# Patient Record
Sex: FEMALE | Race: WHITE | NOT HISPANIC OR LATINO
[De-identification: names, ages, dates, MRNs, and addresses within clinical notes are randomized per-mention and may not be internally consistent; named-entity substitution may affect disease eponyms.]

---

## 2019-08-12 PROBLEM — Z00.00 ENCOUNTER FOR PREVENTIVE HEALTH EXAMINATION: Status: ACTIVE | Noted: 2019-08-12

## 2019-09-03 ENCOUNTER — LABORATORY RESULT (OUTPATIENT)
Age: 49
End: 2019-09-03

## 2019-09-03 ENCOUNTER — OUTPATIENT (OUTPATIENT)
Dept: OUTPATIENT SERVICES | Facility: HOSPITAL | Age: 49
LOS: 1 days | Discharge: HOME | End: 2019-09-03

## 2019-09-03 ENCOUNTER — APPOINTMENT (OUTPATIENT)
Dept: OBGYN | Facility: CLINIC | Age: 49
End: 2019-09-03
Payer: COMMERCIAL

## 2019-09-03 VITALS — HEIGHT: 63 IN | BODY MASS INDEX: 35.44 KG/M2 | WEIGHT: 200 LBS

## 2019-09-03 PROCEDURE — 99396 PREV VISIT EST AGE 40-64: CPT

## 2019-09-06 DIAGNOSIS — Z01.419 ENCOUNTER FOR GYNECOLOGICAL EXAMINATION (GENERAL) (ROUTINE) WITHOUT ABNORMAL FINDINGS: ICD-10-CM

## 2019-09-17 ENCOUNTER — APPOINTMENT (OUTPATIENT)
Dept: OBGYN | Facility: CLINIC | Age: 49
End: 2019-09-17
Payer: COMMERCIAL

## 2019-09-17 PROCEDURE — 76830 TRANSVAGINAL US NON-OB: CPT

## 2019-09-17 PROCEDURE — 76856 US EXAM PELVIC COMPLETE: CPT | Mod: 59

## 2020-12-01 ENCOUNTER — APPOINTMENT (OUTPATIENT)
Dept: OBGYN | Facility: CLINIC | Age: 50
End: 2020-12-01
Payer: COMMERCIAL

## 2020-12-01 ENCOUNTER — LABORATORY RESULT (OUTPATIENT)
Age: 50
End: 2020-12-01

## 2020-12-01 VITALS — BODY MASS INDEX: 35.44 KG/M2 | TEMPERATURE: 97 F | WEIGHT: 200 LBS | HEIGHT: 63 IN

## 2020-12-01 DIAGNOSIS — D21.9 BENIGN NEOPLASM OF CONNECTIVE AND OTHER SOFT TISSUE, UNSPECIFIED: ICD-10-CM

## 2020-12-01 PROCEDURE — 99072 ADDL SUPL MATRL&STAF TM PHE: CPT

## 2020-12-01 PROCEDURE — 99396 PREV VISIT EST AGE 40-64: CPT

## 2020-12-23 ENCOUNTER — APPOINTMENT (OUTPATIENT)
Dept: OBGYN | Facility: CLINIC | Age: 50
End: 2020-12-23
Payer: COMMERCIAL

## 2020-12-23 PROCEDURE — 99072 ADDL SUPL MATRL&STAF TM PHE: CPT

## 2020-12-23 PROCEDURE — 76705 ECHO EXAM OF ABDOMEN: CPT

## 2020-12-23 PROCEDURE — 76830 TRANSVAGINAL US NON-OB: CPT

## 2021-01-12 ENCOUNTER — LABORATORY RESULT (OUTPATIENT)
Age: 51
End: 2021-01-12

## 2021-02-08 ENCOUNTER — NON-APPOINTMENT (OUTPATIENT)
Age: 51
End: 2021-02-08

## 2021-12-07 ENCOUNTER — APPOINTMENT (OUTPATIENT)
Dept: OBGYN | Facility: CLINIC | Age: 51
End: 2021-12-07

## 2022-04-25 ENCOUNTER — LABORATORY RESULT (OUTPATIENT)
Age: 52
End: 2022-04-25

## 2022-04-26 ENCOUNTER — APPOINTMENT (OUTPATIENT)
Dept: OBGYN | Facility: CLINIC | Age: 52
End: 2022-04-26
Payer: COMMERCIAL

## 2022-04-26 VITALS — WEIGHT: 210 LBS | BODY MASS INDEX: 37.21 KG/M2 | HEIGHT: 63 IN | TEMPERATURE: 98 F

## 2022-04-26 PROCEDURE — 99396 PREV VISIT EST AGE 40-64: CPT

## 2022-12-22 ENCOUNTER — NON-APPOINTMENT (OUTPATIENT)
Age: 52
End: 2022-12-22

## 2023-01-03 ENCOUNTER — APPOINTMENT (OUTPATIENT)
Dept: OBGYN | Facility: CLINIC | Age: 53
End: 2023-01-03
Payer: COMMERCIAL

## 2023-01-03 VITALS — TEMPERATURE: 98 F | HEIGHT: 62 IN | BODY MASS INDEX: 38.64 KG/M2 | WEIGHT: 210 LBS

## 2023-01-03 DIAGNOSIS — N81.10 CYSTOCELE, UNSPECIFIED: ICD-10-CM

## 2023-01-03 DIAGNOSIS — N39.3 STRESS INCONTINENCE (FEMALE) (MALE): ICD-10-CM

## 2023-01-03 DIAGNOSIS — N60.09 SOLITARY CYST OF UNSPECIFIED BREAST: ICD-10-CM

## 2023-01-03 PROCEDURE — 99213 OFFICE O/P EST LOW 20 MIN: CPT

## 2023-01-04 PROBLEM — N39.3 URINARY INCONTINENCE, STRESS, MALE: Status: ACTIVE | Noted: 2022-04-27

## 2023-01-04 PROBLEM — N81.10 FEMALE CYSTOCELE: Status: ACTIVE | Noted: 2023-01-04

## 2023-01-04 PROBLEM — N60.09 BREAST CYST: Status: ACTIVE | Noted: 2023-01-04

## 2023-06-27 ENCOUNTER — NON-APPOINTMENT (OUTPATIENT)
Age: 53
End: 2023-06-27

## 2023-07-07 DIAGNOSIS — R92.8 OTHER ABNORMAL AND INCONCLUSIVE FINDINGS ON DIAGNOSTIC IMAGING OF BREAST: ICD-10-CM

## 2024-03-04 ENCOUNTER — LABORATORY RESULT (OUTPATIENT)
Age: 54
End: 2024-03-04

## 2024-03-05 ENCOUNTER — APPOINTMENT (OUTPATIENT)
Dept: OBGYN | Facility: CLINIC | Age: 54
End: 2024-03-05
Payer: COMMERCIAL

## 2024-03-05 VITALS — HEIGHT: 62 IN | BODY MASS INDEX: 39.56 KG/M2 | WEIGHT: 215 LBS | TEMPERATURE: 93.5 F

## 2024-03-05 DIAGNOSIS — Z78.0 ASYMPTOMATIC MENOPAUSAL STATE: ICD-10-CM

## 2024-03-05 DIAGNOSIS — Z01.419 ENCOUNTER FOR GYNECOLOGICAL EXAMINATION (GENERAL) (ROUTINE) W/OUT ABNORMAL FINDINGS: ICD-10-CM

## 2024-03-05 PROCEDURE — 99396 PREV VISIT EST AGE 40-64: CPT

## 2024-03-07 PROBLEM — Z78.0 MENOPAUSE: Status: ACTIVE | Noted: 2024-03-07

## 2024-03-07 PROBLEM — Z01.419 WELL WOMAN EXAM: Status: ACTIVE | Noted: 2019-09-03

## 2024-03-07 NOTE — DISCUSSION/SUMMARY
[FreeTextEntry1] : 53 YEAR OLD FEMALE LMP 3/3/2021-(HYSTERECTOMY -REMOVAL OF UTERUS AND TUBES) PRESNTS FOR ANNUAL WELL WOMAN GYNECOLOGOIC EXAMINATION AND PAP .LAST SEEN FOR GYN VISIT 1/3/2023.  LAST WELL WOMAN VISIT AND  PAP WAS DONE 4/26/22; NEGATIVE PAP AND HPV HR. NO NEW MEDICAL OR SURGICAL HISTORY SINCE LAST VISIT. MEDICATIONS; VITAMINS D 1000 IU/DAY MEDICATION ALLERGIES; NONE ROS;BMS-NROMAL NO FAM HISTORY OF COLON CANCER; NO BASELINE COLONOSCOPY YET               PT IS GOING SOON.           + STRESS URINARY INCONTINENCE-UNCHANGED           NOT SEXUALLY ACTIVE. BREASTS; STILL NOT DOING BREAST SELF EXAMINATION                     RECNETLY HAD MAMMOGRAHY AND BREAST US RT- BIRADS III- F/U FOR 6 MONTHS                     SISTER WITH HISTORY OF BREAST CANCER + WALKS; + MULTIVITAMINS; + DAIRY; - TOBACCO OR VAPING  PE; /90; TEMP 98./3;WEIGHT 215 LBS HEIGHT 5'2"        BREASTS; NO MASSES OR DISCHARGES        ABDOMEN;SOFT ; NO MASSES; NO TENDERNESS TO PALPATION        PELVIC; NORMAL EXTERNAL GENITALIA                       NORMAL URETHRAL MEATUS                       NORMAL VAGINA WITHOUT LESION                       NORMAL VAGINAL VAULT                       NO PALPABLE ADNEXAL MASSES ON BIMANUAL EXAMINATION                       NO PERINEAL OR ARLENE RECTAL LESIONS  IMP; WELL WOMAN          MENOPAUSE          STRESS URINARY INCONTINENCE          POST GARRY BS  PLAN ;THIN PREP PAP WITH HR HPV TESTING DONE-PT OT CALL IN 2 WKS FOR RESULTS             BREAST SELF EXAMINATION MONTHLY CONTINUED TO BE STRONGLY ADVISED             FOLOLOW UP RT BREAST US IN 6 MONTHS-SCIRPT GIVEN; MAMMMGORPAHY 1 YR             ADVISED PT TO SEE PCP RE - ELEVATED BPS.

## 2025-02-17 ENCOUNTER — NON-APPOINTMENT (OUTPATIENT)
Age: 55
End: 2025-02-17

## 2025-02-18 ENCOUNTER — NON-APPOINTMENT (OUTPATIENT)
Age: 55
End: 2025-02-18

## 2025-04-08 ENCOUNTER — LABORATORY RESULT (OUTPATIENT)
Age: 55
End: 2025-04-08

## 2025-04-08 ENCOUNTER — APPOINTMENT (OUTPATIENT)
Dept: OBGYN | Facility: CLINIC | Age: 55
End: 2025-04-08
Payer: COMMERCIAL

## 2025-04-08 VITALS — WEIGHT: 228 LBS | TEMPERATURE: 97.1 F | BODY MASS INDEX: 41.96 KG/M2 | HEIGHT: 62 IN

## 2025-04-08 DIAGNOSIS — I10 ESSENTIAL (PRIMARY) HYPERTENSION: ICD-10-CM

## 2025-04-08 DIAGNOSIS — N39.3 STRESS INCONTINENCE (FEMALE) (MALE): ICD-10-CM

## 2025-04-08 DIAGNOSIS — Z01.419 ENCOUNTER FOR GYNECOLOGICAL EXAMINATION (GENERAL) (ROUTINE) W/OUT ABNORMAL FINDINGS: ICD-10-CM

## 2025-04-08 DIAGNOSIS — N81.10 CYSTOCELE, UNSPECIFIED: ICD-10-CM

## 2025-04-08 LAB
APPEARANCE: CLEAR
BILIRUBIN URINE: NEGATIVE
BLOOD URINE: NEGATIVE
COLOR: YELLOW
GLUCOSE QUALITATIVE U: NEGATIVE
KETONES URINE: NEGATIVE
LEUKOCYTE ESTERASE URINE: ABNORMAL
NITRITE URINE: NEGATIVE
PH URINE: 5.5
PROTEIN URINE: NEGATIVE
SPECIFIC GRAVITY URINE: >=1.03
UROBILINOGEN URINE: 0.2 (ref 0.2–?)

## 2025-04-08 PROCEDURE — 99396 PREV VISIT EST AGE 40-64: CPT
